# Patient Record
Sex: MALE | Race: WHITE | URBAN - METROPOLITAN AREA
[De-identification: names, ages, dates, MRNs, and addresses within clinical notes are randomized per-mention and may not be internally consistent; named-entity substitution may affect disease eponyms.]

---

## 2017-02-14 ENCOUNTER — HOSPITAL ENCOUNTER (OUTPATIENT)
Facility: CLINIC | Age: 52
Discharge: HOME | End: 2017-02-14
Attending: OPHTHALMOLOGY | Admitting: OPHTHALMOLOGY
Payer: COMMERCIAL

## 2017-02-14 ENCOUNTER — ANESTHESIA EVENT (OUTPATIENT)
Dept: SURGERY | Facility: CLINIC | Age: 52
End: 2017-02-14
Payer: COMMERCIAL

## 2017-02-14 ENCOUNTER — ANESTHESIA (OUTPATIENT)
Dept: SURGERY | Facility: CLINIC | Age: 52
End: 2017-02-14
Payer: COMMERCIAL

## 2017-02-14 VITALS
RESPIRATION RATE: 16 BRPM | HEART RATE: 60 BPM | HEIGHT: 74 IN | TEMPERATURE: 97.6 F | BODY MASS INDEX: 40.43 KG/M2 | OXYGEN SATURATION: 94 % | WEIGHT: 315 LBS | DIASTOLIC BLOOD PRESSURE: 107 MMHG | SYSTOLIC BLOOD PRESSURE: 155 MMHG

## 2017-02-14 PROCEDURE — 71000028 ZZH EYE RECOVERY PHASE 2 EACH 15 MINS: Performed by: OPHTHALMOLOGY

## 2017-02-14 PROCEDURE — 25000128 H RX IP 250 OP 636: Performed by: NURSE ANESTHETIST, CERTIFIED REGISTERED

## 2017-02-14 PROCEDURE — 40000170 ZZH STATISTIC PRE-PROCEDURE ASSESSMENT II: Performed by: OPHTHALMOLOGY

## 2017-02-14 PROCEDURE — 36000104 ZZH EYE SURGERY LEVEL 4 1ST 30 MIN: Performed by: OPHTHALMOLOGY

## 2017-02-14 PROCEDURE — 27210794 ZZH OR GENERAL SUPPLY STERILE: Performed by: OPHTHALMOLOGY

## 2017-02-14 PROCEDURE — 37000009 ZZH ANESTHESIA TECHNICAL FEE, EACH ADDTL 15 MIN: Performed by: OPHTHALMOLOGY

## 2017-02-14 PROCEDURE — 27210995 ZZH RX 272: Performed by: OPHTHALMOLOGY

## 2017-02-14 PROCEDURE — 25800025 ZZH RX 258: Performed by: ANESTHESIOLOGY

## 2017-02-14 PROCEDURE — 25000128 H RX IP 250 OP 636: Performed by: OPHTHALMOLOGY

## 2017-02-14 PROCEDURE — 37000008 ZZH ANESTHESIA TECHNICAL FEE, 1ST 30 MIN: Performed by: OPHTHALMOLOGY

## 2017-02-14 PROCEDURE — 25000125 ZZHC RX 250: Performed by: OPHTHALMOLOGY

## 2017-02-14 PROCEDURE — 27211046 ZZH EYE GAS ISPAN SF6: Performed by: OPHTHALMOLOGY

## 2017-02-14 PROCEDURE — 25000132 ZZH RX MED GY IP 250 OP 250 PS 637: Performed by: OPHTHALMOLOGY

## 2017-02-14 PROCEDURE — 25000125 ZZHC RX 250: Performed by: NURSE ANESTHETIST, CERTIFIED REGISTERED

## 2017-02-14 PROCEDURE — 36000105 ZZH EYE SURGERY LEVEL 4 EA 15 ADDTL MIN: Performed by: OPHTHALMOLOGY

## 2017-02-14 RX ORDER — ONDANSETRON 2 MG/ML
INJECTION INTRAMUSCULAR; INTRAVENOUS PRN
Status: DISCONTINUED | OUTPATIENT
Start: 2017-02-14 | End: 2017-02-14

## 2017-02-14 RX ORDER — KETOROLAC TROMETHAMINE 30 MG/ML
INJECTION, SOLUTION INTRAMUSCULAR; INTRAVENOUS PRN
Status: DISCONTINUED | OUTPATIENT
Start: 2017-02-14 | End: 2017-02-14

## 2017-02-14 RX ORDER — CYCLOPENTOLATE HYDROCHLORIDE 10 MG/ML
1 SOLUTION/ DROPS OPHTHALMIC
Status: COMPLETED | OUTPATIENT
Start: 2017-02-14 | End: 2017-02-14

## 2017-02-14 RX ORDER — PROPOFOL 10 MG/ML
INJECTION, EMULSION INTRAVENOUS PRN
Status: DISCONTINUED | OUTPATIENT
Start: 2017-02-14 | End: 2017-02-14

## 2017-02-14 RX ORDER — PREDNISOLONE ACETATE 10 MG/ML
1 SUSPENSION/ DROPS OPHTHALMIC 4 TIMES DAILY
Qty: 5 ML | Refills: 0 | COMMUNITY
Start: 2017-02-14

## 2017-02-14 RX ORDER — LIDOCAINE HYDROCHLORIDE 20 MG/ML
INJECTION, SOLUTION INFILTRATION; PERINEURAL PRN
Status: DISCONTINUED | OUTPATIENT
Start: 2017-02-14 | End: 2017-02-14

## 2017-02-14 RX ORDER — DEXAMETHASONE SODIUM PHOSPHATE 4 MG/ML
INJECTION, SOLUTION INTRA-ARTICULAR; INTRALESIONAL; INTRAMUSCULAR; INTRAVENOUS; SOFT TISSUE PRN
Status: DISCONTINUED | OUTPATIENT
Start: 2017-02-14 | End: 2017-02-14

## 2017-02-14 RX ORDER — PHENYLEPHRINE HYDROCHLORIDE 25 MG/ML
1 SOLUTION/ DROPS OPHTHALMIC
Status: COMPLETED | OUTPATIENT
Start: 2017-02-14 | End: 2017-02-14

## 2017-02-14 RX ORDER — ATROPINE SULFATE 10 MG/ML
SOLUTION/ DROPS OPHTHALMIC
Qty: 5 ML | Refills: 0 | COMMUNITY
Start: 2017-02-14

## 2017-02-14 RX ORDER — TROPICAMIDE 10 MG/ML
1 SOLUTION/ DROPS OPHTHALMIC
Status: COMPLETED | OUTPATIENT
Start: 2017-02-14 | End: 2017-02-14

## 2017-02-14 RX ORDER — DEXAMETHASONE SODIUM PHOSPHATE 10 MG/ML
INJECTION, SOLUTION INTRAMUSCULAR; INTRAVENOUS PRN
Status: DISCONTINUED | OUTPATIENT
Start: 2017-02-14 | End: 2017-02-14 | Stop reason: HOSPADM

## 2017-02-14 RX ORDER — ATROPINE SULFATE 10 MG/ML
SOLUTION/ DROPS OPHTHALMIC PRN
Status: DISCONTINUED | OUTPATIENT
Start: 2017-02-14 | End: 2017-02-14 | Stop reason: HOSPADM

## 2017-02-14 RX ORDER — SODIUM CHLORIDE, SODIUM LACTATE, POTASSIUM CHLORIDE, CALCIUM CHLORIDE 600; 310; 30; 20 MG/100ML; MG/100ML; MG/100ML; MG/100ML
INJECTION, SOLUTION INTRAVENOUS CONTINUOUS
Status: DISCONTINUED | OUTPATIENT
Start: 2017-02-14 | End: 2017-02-14 | Stop reason: HOSPADM

## 2017-02-14 RX ORDER — ACETAMINOPHEN 500 MG
1000 TABLET ORAL ONCE
Status: COMPLETED | OUTPATIENT
Start: 2017-02-14 | End: 2017-02-14

## 2017-02-14 RX ORDER — NEOMYCIN SULFATE, POLYMYXIN B SULFATE, AND DEXAMETHASONE 3.5; 10000; 1 MG/G; [USP'U]/G; MG/G
OINTMENT OPHTHALMIC PRN
Status: DISCONTINUED | OUTPATIENT
Start: 2017-02-14 | End: 2017-02-14 | Stop reason: HOSPADM

## 2017-02-14 RX ORDER — BALANCED SALT SOLUTION 6.4; .75; .48; .3; 3.9; 1.7 MG/ML; MG/ML; MG/ML; MG/ML; MG/ML; MG/ML
SOLUTION OPHTHALMIC PRN
Status: DISCONTINUED | OUTPATIENT
Start: 2017-02-14 | End: 2017-02-14 | Stop reason: HOSPADM

## 2017-02-14 RX ADMIN — PROPOFOL 50 MG: 10 INJECTION, EMULSION INTRAVENOUS at 14:20

## 2017-02-14 RX ADMIN — LIDOCAINE HYDROCHLORIDE 40 MG: 20 INJECTION, SOLUTION INFILTRATION; PERINEURAL at 14:19

## 2017-02-14 RX ADMIN — KETOROLAC TROMETHAMINE 30 MG: 30 INJECTION, SOLUTION INTRAMUSCULAR at 15:05

## 2017-02-14 RX ADMIN — CYCLOPENTOLATE HYDROCHLORIDE 1 DROP: 10 SOLUTION/ DROPS OPHTHALMIC at 13:20

## 2017-02-14 RX ADMIN — TROPICAMIDE 1 DROP: 10 SOLUTION/ DROPS OPHTHALMIC at 13:15

## 2017-02-14 RX ADMIN — DEXMEDETOMIDINE 8 MCG: 100 INJECTION, SOLUTION, CONCENTRATE INTRAVENOUS at 14:29

## 2017-02-14 RX ADMIN — TROPICAMIDE 1 DROP: 10 SOLUTION/ DROPS OPHTHALMIC at 13:20

## 2017-02-14 RX ADMIN — CYCLOPENTOLATE HYDROCHLORIDE 1 DROP: 10 SOLUTION/ DROPS OPHTHALMIC at 13:31

## 2017-02-14 RX ADMIN — DEXAMETHASONE SODIUM PHOSPHATE 4 MG: 4 INJECTION, SOLUTION INTRAMUSCULAR; INTRAVENOUS at 14:17

## 2017-02-14 RX ADMIN — MIDAZOLAM HYDROCHLORIDE 2 MG: 1 INJECTION, SOLUTION INTRAMUSCULAR; INTRAVENOUS at 14:18

## 2017-02-14 RX ADMIN — DEXMEDETOMIDINE 4 MCG: 100 INJECTION, SOLUTION, CONCENTRATE INTRAVENOUS at 14:33

## 2017-02-14 RX ADMIN — ACETAMINOPHEN 1000 MG: 500 TABLET, COATED ORAL at 16:02

## 2017-02-14 RX ADMIN — CYCLOPENTOLATE HYDROCHLORIDE 1 DROP: 10 SOLUTION/ DROPS OPHTHALMIC at 13:15

## 2017-02-14 RX ADMIN — TROPICAMIDE 1 DROP: 10 SOLUTION/ DROPS OPHTHALMIC at 13:31

## 2017-02-14 RX ADMIN — SODIUM CHLORIDE, POTASSIUM CHLORIDE, SODIUM LACTATE AND CALCIUM CHLORIDE: 600; 310; 30; 20 INJECTION, SOLUTION INTRAVENOUS at 13:35

## 2017-02-14 RX ADMIN — PHENYLEPHRINE HYDROCHLORIDE 1 DROP: 2.5 SOLUTION/ DROPS OPHTHALMIC at 13:15

## 2017-02-14 RX ADMIN — PHENYLEPHRINE HYDROCHLORIDE 1 DROP: 2.5 SOLUTION/ DROPS OPHTHALMIC at 13:20

## 2017-02-14 RX ADMIN — ONDANSETRON 4 MG: 2 INJECTION INTRAMUSCULAR; INTRAVENOUS at 15:03

## 2017-02-14 RX ADMIN — DEXMEDETOMIDINE 8 MCG: 100 INJECTION, SOLUTION, CONCENTRATE INTRAVENOUS at 14:27

## 2017-02-14 RX ADMIN — PROPOFOL 30 MG: 10 INJECTION, EMULSION INTRAVENOUS at 14:21

## 2017-02-14 RX ADMIN — PHENYLEPHRINE HYDROCHLORIDE 1 DROP: 2.5 SOLUTION/ DROPS OPHTHALMIC at 13:31

## 2017-02-14 NOTE — IP AVS SNAPSHOT
[facility]    [address]    [place] [address]    Phone:  [phone]    Fax:  [phone]                                       After Visit Summary   2/14/2017    Jewel Bhatia    MRN: [id number]           After Visit Summary Signature Page     I have received my discharge instructions, and my questions have been answered. I have discussed any challenges I see with this plan with the nurse or doctor.    ..........................................................................................................................................  Patient/Patient Representative Signature      ..........................................................................................................................................  Patient Representative Print Name and Relationship to Patient    ..................................................               ................................................  Date                                            Time    ..........................................................................................................................................  Reviewed by Signature/Title    ...................................................              ..............................................  Date                                                            Time

## 2017-02-14 NOTE — ANESTHESIA PREPROCEDURE EVALUATION
Anesthesia Evaluation     . Pt has had prior anesthetic.     No history of anesthetic complications     ROS/MED HX    ENT/Pulmonary:     (+)VALERIA risk factors snores loudly, obese, Mild Persistent asthma (Well controlled with advair.  No rescue inhaler needed) , . Other pulmonary disease retinal detachment.   (-) sleep apnea   Neurologic:       Cardiovascular:         METS/Exercise Tolerance:     Hematologic:         Musculoskeletal:         GI/Hepatic:        (-) GERD   Renal/Genitourinary:         Endo:     (+) thyroid problem hypothyroidism, Obesity, .      Psychiatric:         Infectious Disease:         Malignancy:         Other:               Physical Exam  Normal systems: cardiovascular, pulmonary and dental    Airway   Mallampati: III  TM distance: >3 FB  Neck ROM: full    Dental     Cardiovascular       Pulmonary                     Anesthesia Plan      History & Physical Review  History and physical reviewed and following examination; no interval change.    ASA Status:  3 .    NPO Status:  > 8 hours    Plan for MAC Reason for MAC:  Procedure to face, neck, head or breast  PONV prophylaxis:  Ondansetron (or other 5HT-3)  Retrobulbar by surgeon      Postoperative Care      Consents  Anesthetic plan, risks, benefits and alternatives discussed with:  Patient..                          .

## 2017-02-14 NOTE — DISCHARGE INSTRUCTIONS
Dr Michel       After the surgery    Many retina operations involve the use of gas bubbles, or oil bubbles in the eye.  If this is true in your case, you may be asked to position a certain way following the surgery.  The nurse will go over this in detail with you.    When you go home, you can eat and drink as much as you feel comfortable.  Many retina operations make you feel less hungry or even a little nauseous.  This is to be expected.    You will be given eye drops prescriptions to fill that day.  You don t need to start the drops until the next day.  Please bring these drops with you to the doctor.      You may take a bath or shower as usual for you.  If the patch falls off, please simply leave it off.    It is normal to have some moderate discomfort, and nausea.  The doctor may give you pain medication to help with this discomfort.  If nothing was prescribed for you, you can take ibuprofen(Advil) or acetaminopen (Tylenol) as directed on the bottle. If you have significant discomfort that isn t relieved with pain medications, you should call Doctor.    Recovering after surgery    Retina operations are not like cataract surgery.  The vision often takes weeks or months to fully improve following the surgery.  DO NOT BE DISCOURAGED IF YOU DON T SEE WELL IMMEDIATELY FOLLOWING THE SURGERY.  PATIENTS WITH GAS BUBBLES IN THE EYE CAN T SEE ANY DETAIL AT ALL UNTIL THE GAS BUBBLE RESORBS.      Patients that have a gas/air bubble placed in their eye will have a green medical alert band on their wrist.  This band should not be removed until the doctor removes it for you or gives you permission to remove it.    You cannot fly in an airplane or drive into the mountains as long as the air or gas bubble remains in your eye.    You will have eye drops to use over the first several weeks following the surgery.  The doctor will give you detailed instructions on when to take them on your post-op visit.    If positioning is  required following the surgery, it usually is required for 5-7 days.    Most people do not feel able to return to work for at least one week and sometimes up to two or three weeks following the surgery.    Frequently asked questions    What do you mean when you say positioning following retinal surgery?  Face down through the day on Tuesday.  May get up for 5-10 minutes while awake stand up straight stretch and walk around.  Tuesday night sleep with right cheek down.  [place] Wednesday morning until noon.    Gas or oil bubbles are used in retina surgery to either close holes or help keep the retina attached.  The gas bubbles rises up presses against the highest position of the eye.  Depending on the area of damage to the retina, your doctor may ask you to position on your left side, right side, face down, or upright, or some combination of these positions.  You should try to stay in that position 90% of the time, taking breaks to eat, stretch, go to the bathroom, and have a bath or shower.  You can rent a massage chair that often helps in this position.  At night you should do your best to stay in this position as well.  Your doctor knows how difficult this can be, but can t stress enough how important it is for success of the surgery.  Your doctor will tell you how long this is necessary.    What symptoms should concern me following surgery?    You should be concerned if:    The eye becomes increasingly more painful and the pain medication stops working.    The vision gets darker or dimmer.    Green thick discharge appears.    What are the drops for?    One drop will be an antibiotic.  It is meant to prevent infection.    One drop will be Pred Forte.  It is a steroid drop.  It is meant to reduce inflammation and promote healing.  It usually is continued for the longest time and is gradually tapered over several weeks.    One drop will be atropine.  It dilates the pupil and prevents the iris from going into spasm.   It is usually used for 1-2 weeks.  Many times patients are started on other drops to lower the pressure in the eye.  These are adjusted as needed.  Sometimes, even pills are required to lower the pressure in the eye.    [facility] Anesthesia Eye Care Center Discharge  Instructions  Anesthesia (Eye Care Center)   Adult Discharge Instructions    For 24 hours after surgery    1. Get plenty of rest.  Make arrangements to have a responsible adult stay with you for at least 6 hours after you leave the hospital.  2. Do not drive or use heavy equipment for 24 hours.    3. Do not drink alcohol for 24 hours.  4. Do not sign legal documents or make important decisions for 24 hours.  5. Avoid strenuous or risky activities. You may feel lightheaded.  If so, sit for a few minutes before standing.  Have someone help you get up.   6. Conscious sedation patients may resume a regular diet..  7. Any questions of medical nature, call your physician.

## 2017-02-14 NOTE — OP NOTE
ATTENDING SURGEON: Rosalio Michel MD   ASSISTANT: None.   TITLE OF PROCEDURE: Pars plana vitrectomy, endolaser photocoagulation, air-fluid exchange, SF6 gas placement, right eye.   PREOPERATIVE DIAGNOSIS: Retinal detachment, right eye.   POSTOPERATIVE DIAGNOSIS: Retinal detachment, right eye.   ANESTHESIA: Monitored, with retrobulbar injection.   ESTIMATED BLOOD LOSS: Less than 1 mL   SPECIMENS: None.   IMPLANT: None   COMPLICATIONS: None.   INDICATION FOR SURGERY: Jewel Bhatia has a history of a retinal detachment, and undergoes repair of the detachment in the Right eye today.   PROCEDURE: The risks, benefits and alternatives to the procedure were reviewed with the patient. All of his questions were answered. Informed consent was signed. The left eye was prepared and draped in the usual fashion. Following performance of a retrobulbar injection with 2% lidocaine and 0.75% bupivacaine which was administered without complication, the eyelids were  with a wire lid speculum.  23G trocars were placed in the superotemporal, inferotemporal, and superonasal quadrants, 3.5mm posterior to the surgical limbus, and the infusion cannula was inserted into the inferotemporal trocar. The tip of the cannula was confirmed to be within the vitreous cavity prior to initiation of posterior infusion. The light pipe endoilluminator and vitreous cutter were used to perform the vitrectomy using the BIOM non-contact lens indirect viewing system. The detachment extended from 10:00 to 4:00, posteriorly into the macula, but not through the foveal center.  Multiple breaks were present superiorly.  The retinal periphery was examined with scleral depression and no additional defects were noted.  A posterior retinotomy was marked with endodiathermy and an air-fluid exchange was performed with the soft-tipped extrusion cannula.  Endolaser was applied around each of the defects and 360 degrees around the equator.  The trocars were  removed and the intraocular pressure remained approximately 12 mmHg following closure of the eye. Subconjunctival injections of Ancef and Decadron were delivered without complication.   The lid speculum was removed. Atropine solution and erythromycin ointment were applied to the ocular surface. The eye was patched and shielded, and the patient was returned to the recovery room in stable condition, having tolerated the procedure well. There were no complications.

## 2017-02-14 NOTE — ANESTHESIA POSTPROCEDURE EVALUATION
Patient: Jewel Bhatia    Procedure(s):  LEFT EYE VITRECTOMY PARSPLANA WITH 23 GAUGE SYSTEM, ENDOLASER, AIR/FLUID EXCHANGE, INFUSION 20% SF6 GAS - Wound Class: I-Clean    Diagnosis:LEFT EYE RETINAL DETACHMENT  Diagnosis Additional Information: No value filed.    Anesthesia Type:  MAC    Note:  Anesthesia Post Evaluation    Patient location during evaluation: PACU  Patient participation: Able to fully participate in evaluation  Level of consciousness: awake  Pain management: adequate  Cardiovascular status: acceptable  Respiratory status: acceptable  Hydration status: acceptable  PONV: none     Anesthetic complications: None          Last vitals:  Vitals:    02/14/17 1331 02/14/17 1514 02/14/17 1527   BP: (!) 149/109 (!) 161/108 (!) 155/107   Pulse: 60     Resp: 18 16 16   Temp: 36.4  C (97.6  F)     SpO2: 95% 98% 94%         Electronically Signed By: EMILY IBRAHIM  February 14, 2017  4:39 PM

## 2017-02-14 NOTE — IP AVS SNAPSHOT
MRN:[id number]                      After Visit Summary   2/14/2017    Jewel Bhatia    MRN: [id number]           Thank you!     Thank you for choosing [facility] for your care. Our goal is always to provide you with excellent care. Hearing back from our patients is one way we can continue to improve our services. Please take a few minutes to complete the written survey that you may receive in the mail after you visit with us. Thank you!        Patient Information     Date Of Birth          1965        About your hospital stay     You were admitted on:  February 14, 2017 You last received care in the:  [facility]    You were discharged on:  February 14, 2017       Who to Call     For medical emergencies, please call 911.  For non-urgent questions about your medical care, please call your primary care provider or clinic, None  For questions related to your surgery, please call your surgery clinic        Attending Provider     Provider Rosalio Mcintosh MD Ophthalmology       Primary Care Provider    None Specified       No primary provider on file.        After Care Instructions     Activity       Avoid strenuous activities the next several days.  No straining or aerobic exercise            Discharge Instructions - Wear eye patch and eye shield        Leave patch and shield in place until tomorrow's appointment.  After that, wear the shield at night with 1 piece of tape only for 1 week only.  No gauze/patch is needed.                  Further instructions from your care team       Dr Michel       After the surgery    Many retina operations involve the use of gas bubbles, or oil bubbles in the eye.  If this is true in your case, you may be asked to position a certain way following the surgery.  The nurse will go over this in detail with you.    When you go home, you can eat and drink as much as you feel comfortable.  Many retina operations make you feel  less hungry or even a little nauseous.  This is to be expected.    You will be given eye drops prescriptions to fill that day.  You don t need to start the drops until the next day.  Please bring these drops with you to the doctor.      You may take a bath or shower as usual for you.  If the patch falls off, please simply leave it off.    It is normal to have some moderate discomfort, and nausea.  The doctor may give you pain medication to help with this discomfort.  If nothing was prescribed for you, you can take ibuprofen(Advil) or acetaminopen (Tylenol) as directed on the bottle. If you have significant discomfort that isn t relieved with pain medications, you should call Doctor.    Recovering after surgery    Retina operations are not like cataract surgery.  The vision often takes weeks or months to fully improve following the surgery.  DO NOT BE DISCOURAGED IF YOU DON T SEE WELL IMMEDIATELY FOLLOWING THE SURGERY.  PATIENTS WITH GAS BUBBLES IN THE EYE CAN T SEE ANY DETAIL AT ALL UNTIL THE GAS BUBBLE RESORBS.      Patients that have a gas/air bubble placed in their eye will have a green medical alert band on their wrist.  This band should not be removed until the doctor removes it for you or gives you permission to remove it.    You cannot fly in an airplane or drive into the mountains as long as the air or gas bubble remains in your eye.    You will have eye drops to use over the first several weeks following the surgery.  The doctor will give you detailed instructions on when to take them on your post-op visit.    If positioning is required following the surgery, it usually is required for 5-7 days.    Most people do not feel able to return to work for at least one week and sometimes up to two or three weeks following the surgery.    Frequently asked questions    What do you mean when you say positioning following retinal surgery?  Face down through the day on Tuesday.  May get up for 5-10 minutes while awake  stand up straight stretch and walk around.  Tuesday night sleep with right cheek down.  [place] Wednesday morning until noon.    Gas or oil bubbles are used in retina surgery to either close holes or help keep the retina attached.  The gas bubbles rises up presses against the highest position of the eye.  Depending on the area of damage to the retina, your doctor may ask you to position on your left side, right side, face down, or upright, or some combination of these positions.  You should try to stay in that position 90% of the time, taking breaks to eat, stretch, go to the bathroom, and have a bath or shower.  You can rent a massage chair that often helps in this position.  At night you should do your best to stay in this position as well.  Your doctor knows how difficult this can be, but can t stress enough how important it is for success of the surgery.  Your doctor will tell you how long this is necessary.    What symptoms should concern me following surgery?    You should be concerned if:    The eye becomes increasingly more painful and the pain medication stops working.    The vision gets darker or dimmer.    Green thick discharge appears.    What are the drops for?    One drop will be an antibiotic.  It is meant to prevent infection.    One drop will be Pred Forte.  It is a steroid drop.  It is meant to reduce inflammation and promote healing.  It usually is continued for the longest time and is gradually tapered over several weeks.    One drop will be atropine.  It dilates the pupil and prevents the iris from going into spasm.  It is usually used for 1-2 weeks.  Many times patients are started on other drops to lower the pressure in the eye.  These are adjusted as needed.  Sometimes, even pills are required to lower the pressure in the eye.    [facility] Anesthesia Eye Care Center Discharge  Instructions  Anesthesia (Eye Care Center)   Adult Discharge Instructions    For 24 hours after  "surgery    1. Get plenty of rest.  Make arrangements to have a responsible adult stay with you for at least 6 hours after you leave the hospital.  2. Do not drive or use heavy equipment for 24 hours.    3. Do not drink alcohol for 24 hours.  4. Do not sign legal documents or make important decisions for 24 hours.  5. Avoid strenuous or risky activities. You may feel lightheaded.  If so, sit for a few minutes before standing.  Have someone help you get up.   6. Conscious sedation patients may resume a regular diet..  7. Any questions of medical nature, call your physician.    Pending Results     No orders found from 2017 to 2/15/2017.            Admission Information     Date & Time Provider Department Dept. Phone    2017 Rosalio Michel MD [facility] [phone]      Your Vitals Were     Blood Pressure Pulse Temperature Respirations Height Weight    155/107 60 97.6  F (36.4  C) (Temporal) 16 1.88 m (6' 2\") 149.7 kg (330 lb)    Pulse Oximetry BMI (Body Mass Index)                94% 42.37 kg/m2          MyChart Information     [facility] lets you send messages to your doctor, view your test results, renew your prescriptions, schedule appointments and more. To sign up, go to [email or web address] . Click on \"Log in\" on the left side of the screen, which will take you to the Welcome page. Then click on \"Sign up Now\" on the right side of the page.     You will be asked to enter the access code listed below, as well as some personal information. Please follow the directions to create your username and password.     Your access code is: [email or web address]  Expires: 5/15/2017  3:34 PM     Your access code will  in 90 days. If you need help or a new code, please call your [place] clinic or [phone].        Care EveryWhere ID     This is your Care EveryWhere ID. This could be used by other organizations to access your [place] medical records  [id number]           Review of your " medicines      CONTINUE these medicines which have NOT CHANGED        Dose / Directions    ADVAIR DISKUS IN        Refills:  0       atropine 1 % ophthalmic solution        Instill into the operative eye(s) 1 times a day starting tomorrow evening.  The drop will be dispensed by the Surgery Center   Quantity:  5 mL   Refills:  0       prednisoLONE acetate 1 % ophthalmic susp   Commonly known as:  PRED FORTE        Dose:  1 drop   Apply 1 drop to eye 4 times daily Instill into operative eye(s) per physician instructions.   Quantity:  5 mL   Refills:  0       SYNTHROID PO        Take by mouth daily   Refills:  0                Protect others around you: Learn how to safely use, store and throw away your medicines at [email or web address].             Medication List: This is a list of all your medications and when to take them. Check marks below indicate your daily home schedule. Keep this list as a reference.      Medications           Morning Afternoon Evening Bedtime As Needed    ADVAIR DISKUS IN                                atropine 1 % ophthalmic solution   Instill into the operative eye(s) 1 times a day starting tomorrow evening.  The drop will be dispensed by the Surgery Center   Last time this was given:  1 drop on 2/14/2017  3:05 PM                                prednisoLONE acetate 1 % ophthalmic susp   Commonly known as:  PRED FORTE   Apply 1 drop to eye 4 times daily Instill into operative eye(s) per physician instructions.                                SYNTHROID PO   Take by mouth daily

## 2017-02-14 NOTE — ANESTHESIA CARE TRANSFER NOTE
Patient: Jewel Bhatia    Procedure(s):  LEFT EYE VITRECTOMY PARSPLANA WITH 23 GAUGE SYSTEM, ENDOLASER, AIR/FLUID EXCHANGE, INFUSION 20% SF6 GAS - Wound Class: I-Clean    Diagnosis: LEFT EYE RETINAL DETACHMENT  Diagnosis Additional Information: No value filed.    Anesthesia Type:   MAC     Note:  Airway :Room Air  Patient transferred to:PACU        Vitals: (Last set prior to Anesthesia Care Transfer)    CRNA VITALS  2/14/2017 1435 - 2/14/2017 1506      2/14/2017             Pulse: 57    Ht Rate: 56    SpO2: 95 %    Resp Rate (set): 10                Electronically Signed By: LINA Miller CRNA  February 14, 2017  3:06 PM

## 2017-02-14 NOTE — OR NURSING
Green Armband applied to left wrist. Patient instructed to not remove armband until told to do so by physician.     POST-OP POSITION: FACE DOWN     Steve Smith RN

## (undated) DEVICE — DRAPE MICROSCOPE DRAPE  EYE DI40001

## (undated) DEVICE — EYE SHIELD PLASTIC

## (undated) DEVICE — EYE SOL BSS 500ML

## (undated) DEVICE — EYE KIT AUTO GAS FOR CONSTELLATION 8065751014

## (undated) DEVICE — EYE DRSG PAD OVAL

## (undated) DEVICE — EYE CANN SOFT TIP 23GA FOR VALVED SET 8065149527

## (undated) DEVICE — SYR 10ML SLIP TIP W/O NDL

## (undated) DEVICE — EYE GAS SF6 PER USE/CC/ML 8065797001

## (undated) DEVICE — EYE NDL RETROBULBAR 25GA 1.5" 581275

## (undated) DEVICE — NDL 18GA 1.5" 305196

## (undated) DEVICE — GLOVE PROTEXIS MICRO 7.0  2D73PM70

## (undated) DEVICE — TAPE SILICONE KIND REMOVAL 1" 2770S-1

## (undated) DEVICE — LINEN TOWEL PACK X5 5464

## (undated) DEVICE — DRSG STERI STRIP 1/2X4" R1547

## (undated) DEVICE — GLOVE PROTEXIS MICRO 8.0  2D73PM80

## (undated) DEVICE — PACK VITRECTOMY PQ15VI57E

## (undated) DEVICE — SYR 05ML SLIP TIP W/O NDL 309647

## (undated) DEVICE — EYE PROBE LASER 23GA FLEX RFID 8065751113

## (undated) DEVICE — EYE PACK 23GA CONSTELLATION PPK4254-03

## (undated) RX ORDER — ATROPINE SULFATE 10 MG/ML
SOLUTION/ DROPS OPHTHALMIC
Status: DISPENSED
Start: 2017-02-14

## (undated) RX ORDER — LIDOCAINE HYDROCHLORIDE 20 MG/ML
INJECTION, SOLUTION EPIDURAL; INFILTRATION; INTRACAUDAL; PERINEURAL
Status: DISPENSED
Start: 2017-02-14

## (undated) RX ORDER — ACETAMINOPHEN 500 MG
TABLET ORAL
Status: DISPENSED
Start: 2017-02-14

## (undated) RX ORDER — KETOROLAC TROMETHAMINE 30 MG/ML
INJECTION, SOLUTION INTRAMUSCULAR; INTRAVENOUS
Status: DISPENSED
Start: 2017-02-14

## (undated) RX ORDER — ONDANSETRON 2 MG/ML
INJECTION INTRAMUSCULAR; INTRAVENOUS
Status: DISPENSED
Start: 2017-02-14

## (undated) RX ORDER — NEOMYCIN SULFATE, POLYMYXIN B SULFATE, AND DEXAMETHASONE 3.5; 10000; 1 MG/G; [USP'U]/G; MG/G
OINTMENT OPHTHALMIC
Status: DISPENSED
Start: 2017-02-14

## (undated) RX ORDER — DEXAMETHASONE SODIUM PHOSPHATE 4 MG/ML
INJECTION, SOLUTION INTRA-ARTICULAR; INTRALESIONAL; INTRAMUSCULAR; INTRAVENOUS; SOFT TISSUE
Status: DISPENSED
Start: 2017-02-14

## (undated) RX ORDER — CEFAZOLIN SODIUM 500 MG/2.2ML
INJECTION, POWDER, FOR SOLUTION INTRAMUSCULAR; INTRAVENOUS
Status: DISPENSED
Start: 2017-02-14

## (undated) RX ORDER — DEXAMETHASONE SODIUM PHOSPHATE 10 MG/ML
INJECTION, SOLUTION INTRAMUSCULAR; INTRAVENOUS
Status: DISPENSED
Start: 2017-02-14